# Patient Record
Sex: FEMALE | Race: WHITE
[De-identification: names, ages, dates, MRNs, and addresses within clinical notes are randomized per-mention and may not be internally consistent; named-entity substitution may affect disease eponyms.]

---

## 2018-01-09 ENCOUNTER — HOSPITAL ENCOUNTER (EMERGENCY)
Dept: HOSPITAL 91 - FTE | Age: 33
Discharge: HOME | End: 2018-01-09
Payer: COMMERCIAL

## 2018-01-09 ENCOUNTER — HOSPITAL ENCOUNTER (EMERGENCY)
Age: 33
Discharge: HOME | End: 2018-01-09

## 2018-01-09 DIAGNOSIS — R21: Primary | ICD-10-CM

## 2018-01-09 DIAGNOSIS — Z85.41: ICD-10-CM

## 2018-01-09 PROCEDURE — 99284 EMERGENCY DEPT VISIT MOD MDM: CPT

## 2018-01-09 PROCEDURE — 96372 THER/PROPH/DIAG INJ SC/IM: CPT

## 2018-01-09 RX ADMIN — METHYLPREDNISOLONE SODIUM SUCCINATE 1 MG: 125 INJECTION, POWDER, FOR SOLUTION INTRAMUSCULAR; INTRAVENOUS at 14:22

## 2018-01-09 RX ADMIN — DIPHENHYDRAMINE HYDROCHLORIDE 1 MG: 50 INJECTION, SOLUTION INTRAMUSCULAR; INTRAVENOUS at 14:22

## 2018-01-09 RX ADMIN — FAMOTIDINE 1 MG: 20 TABLET ORAL at 14:22

## 2018-01-13 ENCOUNTER — HOSPITAL ENCOUNTER (EMERGENCY)
Dept: HOSPITAL 91 - FTE | Age: 33
Discharge: HOME | End: 2018-01-13
Payer: COMMERCIAL

## 2018-01-13 ENCOUNTER — HOSPITAL ENCOUNTER (EMERGENCY)
Age: 33
Discharge: HOME | End: 2018-01-13

## 2018-01-13 DIAGNOSIS — Z85.41: ICD-10-CM

## 2018-01-13 DIAGNOSIS — L29.9: Primary | ICD-10-CM

## 2018-01-13 PROCEDURE — 96372 THER/PROPH/DIAG INJ SC/IM: CPT

## 2018-01-13 PROCEDURE — 99284 EMERGENCY DEPT VISIT MOD MDM: CPT

## 2018-01-13 RX ADMIN — EPINEPHRINE 1 MG: 1 INJECTION, SOLUTION, CONCENTRATE INTRAVENOUS at 19:36

## 2019-06-10 ENCOUNTER — HOSPITAL ENCOUNTER (EMERGENCY)
Dept: HOSPITAL 91 - FTE | Age: 34
Discharge: HOME | End: 2019-06-10
Payer: COMMERCIAL

## 2019-06-10 ENCOUNTER — HOSPITAL ENCOUNTER (EMERGENCY)
Dept: HOSPITAL 10 - FTE | Age: 34
Discharge: HOME | End: 2019-06-10
Payer: COMMERCIAL

## 2019-06-10 VITALS
BODY MASS INDEX: 34.12 KG/M2 | HEIGHT: 67 IN | HEIGHT: 67 IN | WEIGHT: 217.38 LBS | BODY MASS INDEX: 34.12 KG/M2 | WEIGHT: 217.38 LBS

## 2019-06-10 VITALS — DIASTOLIC BLOOD PRESSURE: 73 MMHG | RESPIRATION RATE: 18 BRPM | SYSTOLIC BLOOD PRESSURE: 138 MMHG | HEART RATE: 88 BPM

## 2019-06-10 DIAGNOSIS — Z85.41: ICD-10-CM

## 2019-06-10 DIAGNOSIS — R07.9: Primary | ICD-10-CM

## 2019-06-10 LAB
ADD MAN DIFF?: NO
ANION GAP: 8 (ref 5–13)
BASOPHIL #: 0 10^3/UL (ref 0–0.1)
BASOPHILS %: 0.4 % (ref 0–2)
BLOOD UREA NITROGEN: 14 MG/DL (ref 7–20)
CALCIUM: 9 MG/DL (ref 8.4–10.2)
CARBON DIOXIDE: 27 MMOL/L (ref 21–31)
CHLORIDE: 105 MMOL/L (ref 97–110)
CK INDEX: 1
CK-MB: 1.84 NG/ML (ref 0–2.4)
CREATINE KINASE: 183 IU/L (ref 23–200)
CREATININE: 0.59 MG/DL (ref 0.44–1)
EOSINOPHILS #: 0.1 10^3/UL (ref 0–0.5)
EOSINOPHILS %: 1.4 % (ref 0–7)
GLUCOSE: 97 MG/DL (ref 70–220)
HEMATOCRIT: 38.5 % (ref 37–47)
HEMOGLOBIN: 13 G/DL (ref 12–16)
IMMATURE GRANS #M: 0.03 10^3/UL (ref 0–0.03)
IMMATURE GRANS % (M): 0.4 % (ref 0–0.43)
LYMPHOCYTES #: 2.9 10^3/UL (ref 0.8–2.9)
LYMPHOCYTES %: 33.5 % (ref 15–51)
MEAN CORPUSCULAR HEMOGLOBIN: 30.4 PG (ref 29–33)
MEAN CORPUSCULAR HGB CONC: 33.8 G/DL (ref 32–37)
MEAN CORPUSCULAR VOLUME: 90.2 FL (ref 82–101)
MEAN PLATELET VOLUME: 10.4 FL (ref 7.4–10.4)
MONOCYTE #: 0.4 10^3/UL (ref 0.3–0.9)
MONOCYTES %: 5.1 % (ref 0–11)
NEUTROPHIL #: 5.1 10^3/UL (ref 1.6–7.5)
NEUTROPHILS %: 59.2 % (ref 39–77)
NUCLEATED RED BLOOD CELLS #: 0 10^3/UL (ref 0–0)
NUCLEATED RED BLOOD CELLS%: 0 /100WBC (ref 0–0)
PLATELET COUNT: 327 10^3/UL (ref 140–415)
POTASSIUM: 3.7 MMOL/L (ref 3.5–5.1)
RED BLOOD COUNT: 4.27 10^6/UL (ref 4.2–5.4)
RED CELL DISTRIBUTION WIDTH: 11.9 % (ref 11.5–14.5)
SODIUM: 140 MMOL/L (ref 135–144)
TROPONIN-I: < 0.012 NG/ML (ref 0–0.12)
URINE LEUKOCYTE EST (DIP) POC: (no result)
URINE PH (DIP) POC: 6 (ref 5–8.5)
WHITE BLOOD COUNT: 8.6 10^3/UL (ref 4.8–10.8)

## 2019-06-10 PROCEDURE — 81025 URINE PREGNANCY TEST: CPT

## 2019-06-10 PROCEDURE — 85025 COMPLETE CBC W/AUTO DIFF WBC: CPT

## 2019-06-10 PROCEDURE — 80048 BASIC METABOLIC PNL TOTAL CA: CPT

## 2019-06-10 PROCEDURE — 80307 DRUG TEST PRSMV CHEM ANLYZR: CPT

## 2019-06-10 PROCEDURE — 84484 ASSAY OF TROPONIN QUANT: CPT

## 2019-06-10 PROCEDURE — 99285 EMERGENCY DEPT VISIT HI MDM: CPT

## 2019-06-10 PROCEDURE — 71045 X-RAY EXAM CHEST 1 VIEW: CPT

## 2019-06-10 PROCEDURE — 82553 CREATINE MB FRACTION: CPT

## 2019-06-10 PROCEDURE — 36415 COLL VENOUS BLD VENIPUNCTURE: CPT

## 2019-06-10 PROCEDURE — 82962 GLUCOSE BLOOD TEST: CPT

## 2019-06-10 PROCEDURE — 93005 ELECTROCARDIOGRAM TRACING: CPT

## 2019-06-10 PROCEDURE — 82550 ASSAY OF CK (CPK): CPT

## 2019-06-10 PROCEDURE — 81003 URINALYSIS AUTO W/O SCOPE: CPT

## 2019-06-10 RX ADMIN — ASPIRIN 325 MG ORAL TABLET 1 MG: 325 PILL ORAL at 15:11

## 2019-06-10 NOTE — ERD
ER Documentation


Chief Complaint


Chief Complaint





chest pain w/nunbness radiating down arm and lightheadedness 0430





HPI


History of Present Illness: 33-year-old female who reports a history of 


prediabetes coming in today with complaint of left-sided chest pain with 


numbness rating down her left arm that is been present all day since 0430.  


Patient reports symptoms of lightheadedness.  Patient reports she has had 


symptoms of dizziness lasting few seconds and with cold sweats and head pressure


at least once daily over the past couple of weeks.  Patient reports that her 


primary care doctor knows about the symptoms and is aware.  patient reports she 


had a stress test done approximately 4 years ago.  Patient denies syncopal 


episodes.





At home pharmacological/nonpharmacological treatment for symptoms: Denies





Denies social concerns; Denies recent foreign travel





ROS


All systems reviewed and are negative except as per history of present illness.





Medications


Home Meds


Active Scripts


Epinephrine (Epipen 2-Yousif) 0.3 Mg/0.3 Ml Pen.injctr, 1 EA INJ ONCE PRN for 


ALLERGIC REACTION, #1 EA


   Prov:YEYO ERVIN         1/13/18


Famotidine* (Pepcid*) 20 Mg Tablet, 20 MG PO BID for 10 Days, TAB


   Prov:LATHA TRAORE PA-C         1/9/18


Prednisone* (Prednisone*) 20 Mg Tab, 40 MG PO DAILY for 4 Days, TAB


   Prov:LATHA TRAORE PA-C         1/9/18


Diphenhydramine Hcl* (Benadryl*) 25 Mg Cap, 25 MG PO Q6, #30 CAP


   Prov:LATHA TRAORE PA-C         1/9/18


Simethicone* (Gas-X*) 80 Mg Tab.chew, 80 MG PO Q6H PRN for DI


STENSION/GAS/BLOATING for 4 Days, TAB.CHEW


   Prov:MICHELLE NEGRON DO         9/3/16


Ciprofloxacin Hcl* (Ciprofloxacin Hcl*) 500 Mg Tablet, 500 MG PO BID, #14 TAB


   Prov:MICHELLE NEGRON DO         9/3/16





Allergies


Allergies:  


Uncoded Allergies:  


     ANY TYPE OF STEROIDS (Allergy, Unknown, hives, 6/10/19)





PMhx/Soc


History of Surgery:  Yes (B Tubal Ligation)


Anesthesia Reaction:  No


Hx Neurological Disorder:  No


Hx Respiratory Disorders:  No


Hx Cardiac Disorders:  No


Hx Psychiatric Problems:  Yes (Anxiety,Depression)


Hx Miscellaneous Medical Probl:  Yes (Cervical CA)


Hx Alcohol Use:  No


Hx Substance Use:  No


Hx Tobacco Use:  No


Smoking Status:  Never smoker





FmHx


Family History:  No diabetes, No coronary disease





Physical Exam


Vitals





Vital Signs


  Date      Temp  Pulse  Resp  B/P (MAP)   Pulse Ox  O2          O2 Flow    FiO2


Time                                                 Delivery    Rate


   6/10/19  98.1     88    18      138/73        99


     13:41                           (94)





Physical Exam


Const:   No acute distress, afebrile


Head:   Atraumatic 


Eyes:    Normal Conjunctiva


ENT:    Normal External Ears, Nose and Mouth.


Neck:               Full range of motion. No meningismus.


Resp:   Clear to auscultation bilaterally


Cardio:   Regular rate and rhythm, no murmurs


Abd:    Soft, non tender, non distended.  No guarding, no masses, no rigidity.  


Obese.


Skin:   No petechiae or rashes


Back:   No midline or flank tenderness


Ext:    No cyanosis, or edema


Neur:   Awake and alert x3, speaking in clear sentences, no focal deficits or 


facial asymmetry


Psych:    Normal Mood and Affect


Result Diagram:  


6/10/19 1515                                                                    


           6/10/19 1515





Results 24 hrs





Laboratory Tests


Test
                               6/10/19
15:14   6/10/19
15:15  6/10/19
15:19


Bedside Urine pH (LAB)                       6.0


Bedside Urine Protein (LAB)         Negative


Bedside Urine Glucose (UA)          Negative


Bedside Urine Ketones (LAB)         Negative


Bedside Urine Blood                 Negative


Bedside Urine Nitrite (LAB)         Negative


Bedside Urine Leukocyte
Esterase             1+ 
  
               



(L


POC Beta HCG, Qualitative           NEGATIVE


Urine Opiates Screen                Negative


Urine Barbiturates                  Negative


Urine Amphetamines Screen           Negative


Urine Benzodiazepines Screen        Negative


Urine Cocaine Screen                Negative


Urine Cannabinoids                  Negative


White Blood Count                                    8.6 10^3/ul


Red Blood Count                                     4.27 10^6/ul


Hemoglobin                                             13.0 g/dl


Hematocrit                                                38.5 %


Mean Corpuscular Volume                                  90.2 fl


Mean Corpuscular Hemoglobin                              30.4 pg


Mean Corpuscular                    
                 33.8 g/dl 
  



Hemoglobin
Concent


Red Cell Distribution Width                               11.9 %


Platelet Count                                       327 10^3/UL


Mean Platelet Volume                                     10.4 fl


Immature Granulocytes %                                  0.400 %


Neutrophils %                                             59.2 %


Lymphocytes %                                             33.5 %


Monocytes %                                                5.1 %


Eosinophils %                                              1.4 %


Basophils %                                                0.4 %


Nucleated Red Blood Cells %                          0.0 /100WBC


Immature Granulocytes #                            0.030 10^3/ul


Neutrophils #                                        5.1 10^3/ul


Lymphocytes #                                        2.9 10^3/ul


Monocytes #                                          0.4 10^3/ul


Eosinophils #                                        0.1 10^3/ul


Basophils #                                          0.0 10^3/ul


Nucleated Red Blood Cells #                          0.0 10^3/ul


Sodium Level                                          140 mmol/L


Potassium Level                                       3.7 mmol/L


Chloride Level                                        105 mmol/L


Carbon Dioxide Level                                   27 mmol/L


Anion Gap                                                      8


Blood Urea Nitrogen                                     14 mg/dl


Creatinine                                            0.59 mg/dl


Est Glomerular Filtrat Rate
mL/min  
              > 60 mL/min 
   



Glucose Level                                           97 mg/dl


Calcium Level                                          9.0 mg/dl


Creatine Kinase                                         183 IU/L


Creatine Kinase Index                                        1.0


Creatinine Kinase MB (Mass)                           1.84 ng/ml


Troponin I                                         < 0.012 ng/ml


Bedside Glucose                                                        96 mg/dL





Current Medications


 Medications
   Dose
          Sig/Luci
       Start Time
   Status  Last


 (Trade)       Ordered        Route
 PRN     Stop Time              Admin
Dose


                              Reason                                Admin


 Aspirin
       325 mg         ONCE  STAT
    6/10/19       DC           6/10/19


(Aspirin)                     PO
            14:51
                       15:11



                                             6/10/19 14:54








Procedures/MDM


ED course includes a thorough examination and history. 


Medications: Aspirin


Imaging: Chest x-ray


Labs: CBC, CMP, urinalysis, UDS, troponin, CK, CK-MB





Low suspicion for life-threatening medical emergency.  Low suspicion for cardiac


emergency that requires hospitalization at this time or immediate surgical 


intervention. low suspicion for NSTEMI, STEMI.





Otherwise healthy patient presenting with constellation of symptoms likely 


representing uncomplicated [x] as characterized by history, physical exam 


findings, lab findings, radiology findings.  CBC:      no e/o of systemic in


fection or severe anemia.  CMP:      no e/o severe acidosis, alkalosis, renal 


failure, diabetic ketoacidosis, liver disease.  Troponin negative.  CK and CK-MB


 within normal limits.  UDS negative.  Urinalysis negative for infection.





Chest x-ray showing:


IMPRESSION:


No evidence for active cardiopulmonary disease. 


 


RPTAT: HRGF


_____________________________________________ 


EFRAÍN-Mele  Franke, Physician           Date    Time 


Electronically viewed and signed by R-Ryan Franke, Physician on 06/10/2019 15


:48 





Patient reassessment: Patient hemodynamically stable.  No respiratory distress, 


otherwise relatively well appearing and nontoxic.  Patient with decreased pain 


after administration of aspirin.  Patient verbalizes understanding of discharge 


instructions as well as follow-up care with her primary care doctor with 


possible referral to cardiologist.  Disposition given.  Patient educated on 


diagnoses, prescriptions, follow-up care, return precautions.  Strict return 


precautions given for worsening condition; questions answered discharge.





Disposition for discharge with followup in 2 days with PCP/clinic.





Departure


Diagnosis:  


   Primary Impression:  


   Chest pain


   Chest pain type:  unspecified  Qualified Codes:  R07.9 - Chest pain, 


   unspecified


Condition:  Stable


Patient Instructions:  Chest Pain, Uncertain Cause


Referrals:  


DEN ALICEA MD (PCP)





Additional Instructions:  


Thank you very much for allowing us to participate in your care. 


Your health and safety is our top priority at [facility].  It 


is important to read all discharge instructions and education provided in your 


discharge packet.





Call your primary care doctor TOMORROW for an appointment during the next 2-4 


days and bring all the information.





If the symptoms get worse and your provider is unavailable, return to the 


Emergency Department immediately.











JANNIE PUGA NP            Zhang 10, 2019 17:11